# Patient Record
Sex: MALE | Race: WHITE | NOT HISPANIC OR LATINO | Employment: OTHER | URBAN - METROPOLITAN AREA
[De-identification: names, ages, dates, MRNs, and addresses within clinical notes are randomized per-mention and may not be internally consistent; named-entity substitution may affect disease eponyms.]

---

## 2021-10-16 ENCOUNTER — HOSPITAL ENCOUNTER (EMERGENCY)
Facility: MEDICAL CENTER | Age: 61
End: 2021-10-16
Attending: EMERGENCY MEDICINE | Admitting: EMERGENCY MEDICINE
Payer: MEDICARE

## 2021-10-16 VITALS
WEIGHT: 207.23 LBS | RESPIRATION RATE: 20 BRPM | BODY MASS INDEX: 28.07 KG/M2 | TEMPERATURE: 97.3 F | SYSTOLIC BLOOD PRESSURE: 142 MMHG | HEIGHT: 72 IN | DIASTOLIC BLOOD PRESSURE: 99 MMHG | HEART RATE: 99 BPM | OXYGEN SATURATION: 92 %

## 2021-10-16 DIAGNOSIS — Z76.0 MEDICATION REFILL: ICD-10-CM

## 2021-10-16 DIAGNOSIS — S39.012A STRAIN OF LUMBAR REGION, INITIAL ENCOUNTER: ICD-10-CM

## 2021-10-16 PROCEDURE — 99282 EMERGENCY DEPT VISIT SF MDM: CPT

## 2021-10-16 RX ORDER — HYDROCODONE BITARTRATE AND ACETAMINOPHEN 5; 325 MG/1; MG/1
1 TABLET ORAL EVERY 6 HOURS PRN
Qty: 10 TABLET | Refills: 0 | Status: SHIPPED | OUTPATIENT
Start: 2021-10-16 | End: 2021-10-16 | Stop reason: SDUPTHER

## 2021-10-16 RX ORDER — HYDROCODONE BITARTRATE AND ACETAMINOPHEN 5; 325 MG/1; MG/1
1 TABLET ORAL EVERY 6 HOURS PRN
Qty: 10 TABLET | Refills: 0 | Status: SHIPPED | OUTPATIENT
Start: 2021-10-16 | End: 2021-10-19

## 2021-10-16 RX ORDER — HYDROCODONE BITARTRATE AND ACETAMINOPHEN 10; 325 MG/1; MG/1
1-2 TABLET ORAL
COMMUNITY

## 2021-10-16 RX ORDER — IBUPROFEN 800 MG/1
800 TABLET ORAL EVERY 8 HOURS PRN
Qty: 30 TABLET | Refills: 0 | Status: SHIPPED | OUTPATIENT
Start: 2021-10-16

## 2021-10-16 RX ORDER — IBUPROFEN 800 MG/1
800 TABLET ORAL EVERY 8 HOURS PRN
Qty: 30 TABLET | Refills: 0 | Status: SHIPPED | OUTPATIENT
Start: 2021-10-16 | End: 2021-10-16 | Stop reason: SDUPTHER

## 2021-10-16 NOTE — Clinical Note
Davian Whitten was seen and treated in our emergency department on 10/16/2021.  He may return to work on 10/22/2021.       If you have any questions or concerns, please don't hesitate to call.      Art Almaguer D.O.

## 2021-10-16 NOTE — ED TRIAGE NOTES
Pt amb to triage.  Chief Complaint   Patient presents with   • Low Back Pain   • Medication Refill     Pt moved from [place]. Ran out of pain meds 10d ago.

## 2021-10-16 NOTE — ED PROVIDER NOTES
CHIEF COMPLAINT  Chief Complaint   Patient presents with   • Low Back Pain   • Medication Refill       HPI  Davian Whitten is a 60 y.o. male here for evaluation of low back pain and medication refill. The pt states he has 25 years of low back pain that is unchanged. He states that he had no incontinence to bowel/bladder. No urinary retention, no saddle anesthesia.  No fever/ chills.  No cp, no sob, no abdominal pain.     ROS;  Please see HPI  O/W negative     PAST MEDICAL HISTORY   no bleeding disorders     SOCIAL HISTORY  Social History     Tobacco Use   • Smoking status: Not on file   Substance and Sexual Activity   • Alcohol use: Not on file   • Drug use: Not on file   • Sexual activity: Not on file       SURGICAL HISTORY  patient denies any surgical history    CURRENT MEDICATIONS  Home Medications     Reviewed by Chi Olivo R.N. (Registered Nurse) on 10/16/21 at 1405  Med List Status: Partial   Medication Last Dose Status   HYDROcodone/acetaminophen (NORCO)  MG Tab  Active                ALLERGIES  No Known Allergies    REVIEW OF SYSTEMS  See HPI for further details. Review of systems as above, otherwise all other systems are negative.     PHYSICAL EXAM  VITAL SIGNS: /97   Pulse (!) 110   Temp 36.4 °C (97.6 °F) (Temporal)   Resp 16   Ht 1.829 m (6')   Wt 94 kg (207 lb 3.7 oz)   SpO2 91%   BMI 28.11 kg/m²     Constitutional: Well developed, well nourished. No acute distress.  HEENT: Normocephalic, atraumatic. MMM  Neck: Supple, Full range of motion   Chest/Pulmonary:  No respiratory distress.  Equal expansion   Musculoskeletal: No deformity, no edema, neurovascular intact.   Neuro: Clear speech, appropriate, cooperative, cranial nerves II-XII grossly intact.  Psych: Normal mood and affect      PROCEDURES     MEDICAL RECORD  I have reviewed patient's medical record and pertinent results are listed.    COURSE & MEDICAL DECISION MAKING  I have reviewed any medical record information,  laboratory studies and radiographic results as noted above.    4:06 PM  The pt has chronic back pain, for '25 years.'  He has no fall or injury, no incontinence to bowel bladder, no urinary retention, no saddle anesthesia.  The pt will follow up, or return for any other issues or returns.  Narcotic score noted.     I you have had any blood pressure issues while here in the emergency department, please see your doctor for a further evaluation or work up.    Differential diagnoses include but not limited to: low back strain, cauda equina, epidural abscess.      This patient presents with low back strain .  At this time, I have counseled the patient/family regarding their medications, pain control, and follow up.  They will continue their medications, if any, as prescribed.  They will return immediately for any worsening symptoms and/or any other medical concerns.  They will see their doctor, or contact the doctor provided, in 1-2 days for follow up.       FINAL IMPRESSION  Low back strain  Medication refill      Electronically signed by: Art Almaguer D.O., 10/16/2021 3:40 PM

## 2021-10-16 NOTE — ED NOTES
Pt given d/c instructions, f/u info and aware of RX x 2 for  with verbal understanding.  VSS at discharge.  Pt ambulatory from the ED w/ steady gait.  All belongings in possession on discharge.  Pt escorted to the lobby by RN.

## 2021-10-19 ENCOUNTER — OFFICE VISIT (OUTPATIENT)
Dept: URGENT CARE | Facility: CLINIC | Age: 61
End: 2021-10-19
Payer: MEDICARE

## 2021-10-19 VITALS
BODY MASS INDEX: 31.52 KG/M2 | RESPIRATION RATE: 16 BRPM | TEMPERATURE: 97.6 F | DIASTOLIC BLOOD PRESSURE: 98 MMHG | WEIGHT: 208 LBS | OXYGEN SATURATION: 92 % | HEIGHT: 68 IN | SYSTOLIC BLOOD PRESSURE: 152 MMHG | HEART RATE: 110 BPM

## 2021-10-19 DIAGNOSIS — Z76.5 DRUG-SEEKING BEHAVIOR: ICD-10-CM

## 2021-10-19 DIAGNOSIS — G89.29 CHRONIC MIDLINE LOW BACK PAIN WITHOUT SCIATICA: ICD-10-CM

## 2021-10-19 DIAGNOSIS — M54.50 CHRONIC MIDLINE LOW BACK PAIN WITHOUT SCIATICA: ICD-10-CM

## 2021-10-19 PROCEDURE — 99203 OFFICE O/P NEW LOW 30 MIN: CPT | Performed by: NURSE PRACTITIONER

## 2021-10-19 RX ORDER — ALPRAZOLAM 1 MG/1
1 TABLET ORAL
COMMUNITY
End: 2021-10-19

## 2021-10-19 RX ORDER — KETOROLAC TROMETHAMINE 30 MG/ML
30 INJECTION, SOLUTION INTRAMUSCULAR; INTRAVENOUS ONCE
Status: COMPLETED | OUTPATIENT
Start: 2021-10-19 | End: 2021-10-19

## 2021-10-19 RX ADMIN — KETOROLAC TROMETHAMINE 30 MG: 30 INJECTION, SOLUTION INTRAMUSCULAR; INTRAVENOUS at 18:03

## 2021-10-19 ASSESSMENT — ENCOUNTER SYMPTOMS
TINGLING: 0
SENSORY CHANGE: 0
FEVER: 0
BACK PAIN: 1
FOCAL WEAKNESS: 0
CHILLS: 0
VOMITING: 0
WEAKNESS: 0
ORTHOPNEA: 0
NAUSEA: 0

## 2021-10-20 NOTE — PROGRESS NOTES
Subjective     Davian Whitten is a 60 y.o. male who presents with Back Pain ((R) leg, x month. (requesting print out RX))            HPI   New problem.  Patient is a 60-year-old male who presents with chronic low back pain that is ongoing x30 years.  He is here to get narcotic pain medication.  He was seen in the emergency department 3 days ago and had a prescription for 10 Norco which he has used up.  He is apparently visiting here from [facility].  I have advised him that I am not going to refill narcotic pain medication for his chronic back pain.  He was not very happy with this and I offered him an injection of ketorolac which she will take tonight.  He denies any bowel or bladder issues and states intermittent radiation down either one of his legs.  He denies pelvic pain or paresthesia, abdominal pain, or chest pain.  He does have ibuprofen but states that he does not take that.    Codeine  Current Outpatient Medications on File Prior to Visit   Medication Sig Dispense Refill   • HYDROcodone/acetaminophen (NORCO)  MG Tab Take 1-2 Tablets by mouth every 4 hours while awake.     • HYDROcodone-acetaminophen (NORCO) 5-325 MG Tab per tablet Take 1 Tablet by mouth every 6 hours as needed for up to 3 days. 10 Tablet 0   • ibuprofen (MOTRIN) 800 MG Tab Take 1 Tablet by mouth every 8 hours as needed. 30 Tablet 0     No current facility-administered medications on file prior to visit.     Social History     Socioeconomic History   • Marital status: Single     Spouse name: Not on file   • Number of children: Not on file   • Years of education: Not on file   • Highest education level: Not on file   Occupational History   • Not on file   Tobacco Use   • Smoking status: Not on file   Substance and Sexual Activity   • Alcohol use: Not on file   • Drug use: Not on file   • Sexual activity: Not on file   Other Topics Concern   • Not on file   Social History Narrative   • Not on file     Social Determinants of  "Health     Financial Resource Strain:    • Difficulty of Paying Living Expenses:    Food Insecurity:    • Worried About Running Out of Food in the Last Year:    • Ran Out of Food in the Last Year:    Transportation Needs:    • Lack of Transportation (Medical):    • Lack of Transportation (Non-Medical):    Physical Activity:    • Days of Exercise per Week:    • Minutes of Exercise per Session:    Stress:    • Feeling of Stress :    Social Connections:    • Frequency of Communication with Friends and Family:    • Frequency of Social Gatherings with Friends and Family:    • Attends [employer] Services:    • Active Member of Clubs or Organizations:    • Attends Club or Organization Meetings:    • Marital Status:    Intimate Partner Violence:    • Fear of Current or Ex-Partner:    • Emotionally Abused:    • Physically Abused:    • Sexually Abused:      Breast Cancer-related family history is not on file.      Review of Systems   Constitutional: Negative for chills and fever.   Cardiovascular: Negative for chest pain and orthopnea.   Gastrointestinal: Negative for nausea and vomiting.   Genitourinary: Negative for dysuria.   Musculoskeletal: Positive for back pain.   Neurological: Negative for tingling, sensory change, focal weakness and weakness.              Objective     /98   Pulse (!) 110   Temp 36.4 °C (97.6 °F)   Resp 16   Ht 1.727 m (5' 8\")   Wt 94.3 kg (208 lb)   SpO2 92%   BMI 31.63 kg/m²      Physical Exam  Constitutional:       General: He is not in acute distress.     Appearance: He is well-developed.   Cardiovascular:      Rate and Rhythm: Normal rate and regular rhythm.      Heart sounds: Normal heart sounds. No murmur heard.     Pulmonary:      Effort: Pulmonary effort is normal.      Breath sounds: Normal breath sounds.   Musculoskeletal:      Lumbar back: Tenderness present. No swelling or spasms. Decreased range of motion.   Skin:     General: Skin is warm and dry.   Neurological:      " Mental Status: He is alert.      Sensory: No sensory deficit.      Motor: No abnormal muscle tone.      Gait: Gait normal.                             Assessment & Plan        1. Chronic midline low back pain without sciatica  ketorolac (TORADOL) injection 30 mg   2. Drug-seeking behavior       Patient presents with drug-seeking behavior.  He states he has a pain management physician in [place] and I have advised him that I unable to refill his narcotic pain medication if he is under the care of pain management and has a contract.  He is advised that if he feels like he needs a stronger medication than I am able to provide him he is to return to the emergency department and he may try his luck there.

## 2021-10-22 ENCOUNTER — HOSPITAL ENCOUNTER (EMERGENCY)
Facility: MEDICAL CENTER | Age: 61
End: 2021-10-22
Attending: EMERGENCY MEDICINE
Payer: MEDICARE

## 2021-10-22 VITALS
HEART RATE: 108 BPM | HEIGHT: 72 IN | RESPIRATION RATE: 14 BRPM | OXYGEN SATURATION: 90 % | WEIGHT: 205.03 LBS | BODY MASS INDEX: 27.77 KG/M2 | SYSTOLIC BLOOD PRESSURE: 139 MMHG | DIASTOLIC BLOOD PRESSURE: 102 MMHG | TEMPERATURE: 97.8 F

## 2021-10-22 DIAGNOSIS — M54.42 CHRONIC MIDLINE LOW BACK PAIN WITH BILATERAL SCIATICA: ICD-10-CM

## 2021-10-22 DIAGNOSIS — G89.29 CHRONIC MIDLINE LOW BACK PAIN WITH BILATERAL SCIATICA: ICD-10-CM

## 2021-10-22 DIAGNOSIS — M54.41 CHRONIC MIDLINE LOW BACK PAIN WITH BILATERAL SCIATICA: ICD-10-CM

## 2021-10-22 PROCEDURE — 700111 HCHG RX REV CODE 636 W/ 250 OVERRIDE (IP): Performed by: EMERGENCY MEDICINE

## 2021-10-22 PROCEDURE — 96372 THER/PROPH/DIAG INJ SC/IM: CPT

## 2021-10-22 PROCEDURE — 99283 EMERGENCY DEPT VISIT LOW MDM: CPT

## 2021-10-22 RX ORDER — CYCLOBENZAPRINE HCL 10 MG
10 TABLET ORAL 3 TIMES DAILY PRN
Qty: 30 TABLET | Refills: 0 | Status: SHIPPED | OUTPATIENT
Start: 2021-10-22

## 2021-10-22 RX ORDER — METHYLPREDNISOLONE 4 MG/1
TABLET ORAL
Qty: 21 EACH | Refills: 0 | Status: SHIPPED | OUTPATIENT
Start: 2021-10-22

## 2021-10-22 RX ORDER — KETOROLAC TROMETHAMINE 30 MG/ML
30 INJECTION, SOLUTION INTRAMUSCULAR; INTRAVENOUS ONCE
Status: COMPLETED | OUTPATIENT
Start: 2021-10-22 | End: 2021-10-22

## 2021-10-22 RX ADMIN — KETOROLAC TROMETHAMINE 30 MG: 30 INJECTION, SOLUTION INTRAMUSCULAR at 21:14

## 2021-10-23 NOTE — ED TRIAGE NOTES
Chief Complaint   Patient presents with   • Low Back Pain     8/10 pain, worsened 4 days ago, denies any recent injury. pt states he has had back pain for 20 yrs, states he takes NORCO every here and there. denies numbness & tingling, no loss of bowel or bladder.        Pt ambulated w/ steady gait to triage. Pt A&Ox4, RA, here for above complaint. Pt states he didn't take any pain meds prior to arrival.     Pt to lobby  . Pt educated on alerting staff in changes to condition. Pt verbalized understanding.     /102   Pulse (!) 108   Temp 36.6 °C (97.8 °F) (Temporal)   Resp 14   Ht 1.829 m (6')   Wt 93 kg (205 lb 0.4 oz)   SpO2 90%   BMI 27.81 kg/m²

## 2021-10-23 NOTE — ED NOTES
Pt given medication for back pain and immediately left prior to D/C. Pt refused d/c vitals. Pt left ED a/o x 4 GCS 15 ambulatory without assistance with steady gait.

## 2021-10-23 NOTE — ED PROVIDER NOTES
ED Provider Note    Scribed for Karen Menjivar M.D. by Jesus Maurer. 10/22/2021  8:43 PM    Primary care provider: Pcp Pt States None  Means of arrival: Walk-in  History obtained from: Patient  History limited by: None    CHIEF COMPLAINT  Chief Complaint   Patient presents with    Low Back Pain     8/10 pain, worsened 4 days ago, denies any recent injury. pt states he has had back pain for 20 yrs, states he takes NORCO every here and there. denies numbness & tingling, no loss of bowel or bladder.        HPI  Davian Whitten is a 60 y.o. male who presents to the Emergency Department for evaluation of lower back pain that he rates an 8/10. His pain started about 10 years ago, however it worsened about 4 days ago. His pain radiates down his legs. He was seen here on the 16th, and was given a prescription for Motrin. He denies any urinary or bowel incontinence, or numbness or tingling. No alleviating or exacerbating factors noted.    REVIEW OF SYSTEMS  HEENT:  No ear pain, congestion, or sore throat   EYES: no discharge, redness, or vision changes  CARDIAC: no chest pain, no palpitations    PULMONARY: no dyspnea, cough, or congestion   GI: no vomiting, diarrhea, bowel incontinence, or abdominal pain   : no dysuria, urinary incontinence, or hematuria   Neuro: no weakness, numbness or tingling, aphasia, or headache  Musculoskeletal: Positive for back pain. No swelling, deformity, pain, or joint swelling  Endocrine: no fevers, sweating, or weight loss   SKIN: no rash, erythema, or contusions     See history of present illness. All other systems are negative. C.    PAST MEDICAL HISTORY   has a past medical history of Patient denies medical problems.    SURGICAL HISTORY  patient denies any surgical history    SOCIAL HISTORY  Social History     Tobacco Use    Smoking status: Current Every Day Smoker     Packs/day: 1.00    Smokeless tobacco: Never Used   Vaping Use    Vaping Use: Never used   Substance Use Topics     Alcohol use: Not Currently    Drug use: Never      Social History     Substance and Sexual Activity   Drug Use Never       FAMILY HISTORY  History reviewed. No pertinent family history.    CURRENT MEDICATIONS  Home Medications       Reviewed by Lina Urrutia R.N. (Registered Nurse) on 10/22/21 at 2021  Med List Status: Partial     Medication Last Dose Status   HYDROcodone/acetaminophen (NORCO)  MG Tab  Active   ibuprofen (MOTRIN) 800 MG Tab  Active                    ALLERGIES  Allergies   Allergen Reactions    Codeine Nausea       PHYSICAL EXAM  VITAL SIGNS: /102   Pulse (!) 108   Temp 36.6 °C (97.8 °F) (Temporal)   Resp 14   Ht 1.829 m (6')   Wt 93 kg (205 lb 0.4 oz)   SpO2 90%   BMI 27.81 kg/m²     Constitutional: Well developed, Well nourished, No acute distress, Non-toxic appearance.   HEENT: Normocephalic, Atraumatic  Eyes: PERRL, EOMI, Conjunctiva normal, No discharge.   Neck: Normal range of motion, No tenderness, Supple, No stridor.    Skin: Warm, Dry, No erythema, No rash,   Back:  Lower back pain. No CVA tenderness, bony crepitance, step offs, or instability.   Neurologic: Alert & oriented clear speech no focal deficits. No numbness or tingling in extremities.  Extremities: Equal, intact distal pulses, No cyanosis, clubbing or edema,  No tenderness.   Musculoskeletal: Good range of motion in all major joints. No tenderness to palpation or major deformities noted.     COURSE & MEDICAL DECISION MAKING  Nursing notes, VS, PMSFHx reviewed in chart.    8:43 PM Patient seen and examined at bedside. Patient will be treated with Toradol 30 mg. Discussed plan of care with patient including non narcotic pain medication, and referral to a PCP and pain management. He requested Norco for his pain, however I explained our narcotic policy. He is understandable and agreeable with the plan of care.    The patient will return for new or worsening symptoms and is stable at the time of discharge.    The  patient is referred to a primary physician for blood pressure management, diabetic screening, and for all other preventative health concerns.    DISPOSITION:  Patient will be discharged home in stable condition.    FOLLOW UP:  [address]  [place] [id number] [phone]  Call in 1 day  to establish care, for recheck    Franck Adams M.D.  [address] [facility] [place] [id number] [phone]    Call in 1 day  to establish care, for recheck      OUTPATIENT MEDICATIONS:  Discharge Medication List as of 10/22/2021  9:18 PM        START taking these medications    Details   methylPREDNISolone (MEDROL DOSEPAK) 4 MG Tablet Therapy Pack Take as directed, Disp-21 Each, R-0, Normal      cyclobenzaprine (FLEXERIL) 10 mg Tab Take 1 Tablet by mouth 3 times a day as needed., Disp-30 Tablet, R-0, Normal             FINAL IMPRESSION  1. Chronic midline low back pain with bilateral sciatica          Jesus BEAR (G[place]), am scribing for, and in the presence of, Karen Menjivar M.D..    Electronically signed by: Jesus Maurer (Scribclaudio), 10/22/2021    Karen BEAR M.D. personally performed the services described in this documentation, as scribed by Jesus Maurer in my presence, and it is both accurate and complete.    The note accurately reflects work and decisions made by me.  Karen Menjivar M.D.  10/22/2021  9:24 PM     E